# Patient Record
Sex: FEMALE | Race: WHITE
[De-identification: names, ages, dates, MRNs, and addresses within clinical notes are randomized per-mention and may not be internally consistent; named-entity substitution may affect disease eponyms.]

---

## 2018-02-19 ENCOUNTER — HOSPITAL ENCOUNTER (OUTPATIENT)
Dept: HOSPITAL 44 - LAB | Age: 58
End: 2018-02-19
Attending: INTERNAL MEDICINE
Payer: COMMERCIAL

## 2018-02-19 DIAGNOSIS — E78.00: Primary | ICD-10-CM

## 2018-02-19 DIAGNOSIS — I25.10: ICD-10-CM

## 2018-02-19 LAB
EGFR (AFRICAN): > 60
EGFR (NON-AFRICAN): > 60

## 2018-02-19 PROCEDURE — 80053 COMPREHEN METABOLIC PANEL: CPT

## 2018-02-19 PROCEDURE — 36415 COLL VENOUS BLD VENIPUNCTURE: CPT

## 2018-02-19 PROCEDURE — 80061 LIPID PANEL: CPT

## 2018-02-27 ENCOUNTER — HOSPITAL ENCOUNTER (OUTPATIENT)
Dept: HOSPITAL 44 - RAD | Age: 58
End: 2018-02-27
Attending: NURSE PRACTITIONER
Payer: COMMERCIAL

## 2018-02-27 DIAGNOSIS — M54.5: Primary | ICD-10-CM

## 2018-02-27 DIAGNOSIS — M25.551: ICD-10-CM

## 2018-02-27 DIAGNOSIS — M25.561: ICD-10-CM

## 2018-02-27 PROCEDURE — 73502 X-RAY EXAM HIP UNI 2-3 VIEWS: CPT

## 2018-02-27 PROCEDURE — 72170 X-RAY EXAM OF PELVIS: CPT

## 2018-02-27 PROCEDURE — 73562 X-RAY EXAM OF KNEE 3: CPT

## 2018-02-27 PROCEDURE — 72100 X-RAY EXAM L-S SPINE 2/3 VWS: CPT

## 2018-02-27 NOTE — DIAGNOSTIC IMAGING REPORT
HIMA ZEE (NP) - OP 

[facility]

[address] [facility].[address] [address]

 

 

 

 

Report Submission Date: 2018 11:22:48 AM CST

Patient       Study

Name:   HAI SON       Date:   2018 10:52:42 AM CST

MRN:          Modality Type:   DX

Gender:   F       Description:   LOWER EXTREMITY

:   64       Institution:   [facility]

Physician:   HIMA ZEE (KIM) - OP

     Accession:    [id number]

 

 

Examination: Plain film right knee 



History: HIP/KNEE/LOW BACK PAIN (Hx) 



Findings: 3 views of the right knee demonstrates normal cortical margins. No 
fracture.  No dislocation. No joint effusion. No soft tissue irregularity. 



Impression: No acute osseous abnormality

 

Electronically signed on 2018 11:22:48 AM CST by:

Bethel SUE

## 2018-02-27 NOTE — DIAGNOSTIC IMAGING REPORT
HIMA ZEE (NP) - OP 

[facility]

[address] [facility].[address]. [address]

 

 

 

 

Report Submission Date: 2018 11:19:53 AM CST

Patient       Study

Name:   HAI SON       Date:   2018 10:42:50 AM CST

MRN:          Modality Type:   DX

Gender:   F       Description:   PELVIS

:   64       Institution:   [facility]

Physician:   HIMA ZEE (KIM) - OP

     Accession:    [id number]

 

 

Examination: Plain film right hip 



History: HIP/KNEE/LOW BACK PAIN (Hx) 



Comparison exams: None provided 



Findings: 2 views of the right hip demonstrate normal cortical margins. No 
fracture no dislocation. No soft tissue abnormality. 



Impression: No acute osseous abnormality.

 

Electronically signed on 2018 11:19:53 AM CST by:

Bethel SUE

## 2018-02-27 NOTE — DIAGNOSTIC IMAGING REPORT
HIMA ZEE (NP) - OP 

[facility]

[address] [facility].[address] [address]

 

 

 

 

Report Submission Date: 2018 11:19:53 AM CST

Patient       Study

Name:   HAI SON       Date:   2018 10:42:50 AM CST

MRN:          Modality Type:   DX

Gender:   F       Description:   PELVIS

:   64       Institution:   [facility]

Physician:   HIMA ZEE (KIM) - OP

     Accession:    [id number]

 

 

Examination: Plain film right hip 



History: HIP/KNEE/LOW BACK PAIN (Hx) 



Comparison exams: None provided 



Findings: 2 views of the right hip demonstrate normal cortical margins. No 
fracture no dislocation. No soft tissue abnormality. 



Impression: No acute osseous abnormality.

 

Electronically signed on 2018 11:19:53 AM CST by:

Bethel SUE

## 2018-02-27 NOTE — DIAGNOSTIC IMAGING REPORT
HIMA ZEE (NP) - OP 

[facility]

[address] [address] [address]

 

 

 

 

Report Submission Date: 2018 11:21:43 AM CST

Patient       Study

Name:   HAI SON       Date:   2018 10:47:09 AM CST

MRN:          Modality Type:   DX

Gender:   F       Description:   SPINE

:   64       Institution:   [facility]

Physician:   HIMA ZEE (KIM) - OP

     Accession:    [id number]

 

 

Examination: Plain film lumbar spine 



History: HIP/KNEE/LOW BACK PAIN (Hx) 



Findings: 3 views of the lumbar spine demonstrate normal height.  No anterior 
compression. Few anterior osteophytes. No soft tissue abnormalities. 



Impression: No acute osseous process.

 

Electronically signed on 2018 11:21:43 AM CST by:

Bethel SUE